# Patient Record
Sex: MALE | Race: AMERICAN INDIAN OR ALASKA NATIVE
[De-identification: names, ages, dates, MRNs, and addresses within clinical notes are randomized per-mention and may not be internally consistent; named-entity substitution may affect disease eponyms.]

---

## 2018-04-07 ENCOUNTER — HOSPITAL ENCOUNTER (EMERGENCY)
Dept: HOSPITAL 50 - VM.ED | Age: 38
LOS: 1 days | Discharge: HOME | End: 2018-04-08
Payer: MEDICAID

## 2018-04-07 DIAGNOSIS — T78.3XXA: ICD-10-CM

## 2018-04-07 DIAGNOSIS — F17.210: ICD-10-CM

## 2018-04-07 DIAGNOSIS — T43.4X5A: ICD-10-CM

## 2018-04-07 DIAGNOSIS — Z79.899: ICD-10-CM

## 2018-04-07 DIAGNOSIS — G24.02: Primary | ICD-10-CM

## 2018-04-07 DIAGNOSIS — Z88.8: ICD-10-CM

## 2018-04-07 LAB
CHLORIDE SERPL-SCNC: 108 MMOL/L (ref 98–107)
SODIUM SERPL-SCNC: 143 MMOL/L (ref 136–145)

## 2018-04-07 PROCEDURE — 85025 COMPLETE CBC W/AUTO DIFF WBC: CPT

## 2018-04-07 PROCEDURE — 84484 ASSAY OF TROPONIN QUANT: CPT

## 2018-04-07 PROCEDURE — 99284 EMERGENCY DEPT VISIT MOD MDM: CPT

## 2018-04-07 PROCEDURE — 85610 PROTHROMBIN TIME: CPT

## 2018-04-07 PROCEDURE — 96374 THER/PROPH/DIAG INJ IV PUSH: CPT

## 2018-04-07 PROCEDURE — 80053 COMPREHEN METABOLIC PANEL: CPT

## 2018-04-07 PROCEDURE — 36415 COLL VENOUS BLD VENIPUNCTURE: CPT

## 2018-04-07 PROCEDURE — 96375 TX/PRO/DX INJ NEW DRUG ADDON: CPT

## 2018-04-07 PROCEDURE — 93005 ELECTROCARDIOGRAM TRACING: CPT

## 2018-04-08 NOTE — EDM.PDOC
ED HPI GENERAL MEDICAL PROBLEM





- General


Chief Complaint: Allergic Reaction


Stated Complaint: allergic reaction


Time Seen by Provider: 04/07/18 22:55


Source of Information: Reports: Patient


History Limitations: Reports: No Limitations





- History of Present Illness


INITIAL COMMENTS - FREE TEXT/NARRATIVE: 





Pt. states that he took 10 mg of haldol and shortly thereafter developed onset 

of L sided tongue and lip swelling, as well as muscle spasms and involuntary 

movements to his jaw. Pt. has a history of similar symptoms when he has taken 

compazine. He denies any throat tightness, wheezing, or shortness of breath. 


Pt. states that the symptoms started approx. 20 min after taking the 

medication. He denies any substernal chest pain and starts that he does have 

muscle spasms in his L anterior chest that extend up into his anterior neck and 

jaw.


He has never been formally diagnosed with tardive dyskinesia or dystonic 

reaction, but describes symptoms of this in the past, particularly associated 

with the consumption of first generation antipsychotic medications and other 

phenothiazines.


Location: Reports: Head, Neck, Chest


Quality: Reports: Ache, Throbbing


Severity: Moderate


  ** Chest


Pain Score (Numeric/FACES): 10





- Related Data


 Allergies











Allergy/AdvReac Type Severity Reaction Status Date / Time


 


prochlorperazine Allergy  Seizure Verified 04/07/18 23:03





[From Compazine]     











Home Meds: 


 Home Meds





Benztropine [Cogentin] 1 mg PO QID PRN 04/07/18 [History]


Divalproex Sodium 2 tab PO BEDTIME 04/07/18 [History]


Escitalopram [Lexapro] 10 mg PO DAILY 04/07/18 [History]


Gabapentin [Neurontin] 1 tab PO TID 04/07/18 [History]


Haloperidol [Haldol] 1 tab PO QID PRN 04/07/18 [History]











Past Medical History


Cardiovascular History: Reports: Other (See Below)


Other Cardiovascular History: bradycardia.  Is supposed to make appt with 

cardiology next week to discuss a pacemaker.


Psychiatric History: Reports: Addiction, Schizophrenia





Social & Family History





- Tobacco Use


Smoking Status *Q: Current Every Day Smoker


Years of Tobacco use: 33


Packs/Tins Daily: 0.7





- Recreational Drug Use


Recreational Drug Use: Yes


Drug Use in Last 12 Months: Yes


Recreational Drug Type: Reports: Barbituates, Cocaine, Heroin, Methamphetamine





ED ROS GENERAL





- Review of Systems


Review Of Systems: See Below


Constitutional: Reports: No Symptoms


HEENT: Reports: Other (See HPI. Complains of facial tics and jaw/neck pain as 

well as facial swelling)


Respiratory: Reports: No Symptoms


Cardiovascular: Reports: No Symptoms


Endocrine: Reports: No Symptoms


GI/Abdominal: Reports: No Symptoms


: Reports: No Symptoms


Musculoskeletal: Reports: No Symptoms


Skin: Reports: No Symptoms


Neurological: Reports: Tremors, Other (please see HPI).  Denies: Confusion, 

Dizziness, Headache, Paresthesia, Seizure, Syncope, Trouble Speaking, 

Difficulty Walking, Weakness, Change in Speech, Gait Disturbance


Psychiatric: Reports: Other (History of depression and anxiety, previously was 

IV meth, cocaine, and heroin user, recently completed treatment).  Denies: 

Agitation, Anxiety, Confusion, Cravings, Depression, Hallucinations, Homicidal 

Ideation, Mood Lability, Suicidal Ideation


Hematologic/Lymphatic: Reports: No Symptoms


Immunologic: Reports: No Symptoms





ED EXAM, GENERAL





- Physical Exam


Exam: See Below


Exam Limited By: No Limitations


General Appearance: Alert, WD/WN, No Apparent Distress


Eye Exam: Bilateral Eye: EOMI, Normal Fundi, Normal Inspection, PERRL


Ears: Normal External Exam, Normal Canal, Hearing Grossly Normal, Normal TMs


Ear Exam: Bilateral Ear: Auricle Normal, Canal Normal, TM normal


Nose: Normal Inspection, Normal Mucosa, No Blood


Throat/Mouth: Normal Gums, Normal Voice, No Airway Compromise, Other


Head: Atraumatic, Facial Swelling (R sided lip and tongue edema, mild)


Neck: Supple, Tender Lateral (L lateral neck discomfort/muscle spasm).  No: 

Limited Range of Motion, Lymphadenopathy (L), Lymphadenopathy (R)


Respiratory/Chest: No Respiratory Distress, Lungs Clear, Normal Breath Sounds, 

No Accessory Muscle Use, Chest Non-Tender


Cardiovascular: Normal Peripheral Pulses, Regular Rate, Rhythm, No Edema, No 

Gallop, No JVD, No Murmur, No Rub


GI/Abdominal: Normal Bowel Sounds, Soft, Non-Tender, No Organomegaly, No 

Distention, No Abnormal Bruit, No Mass


 (Male) Exam: Deferred


Rectal (Males) Exam: Deferred


Back Exam: Normal Inspection, Full Range of Motion, NT


Extremities: Normal Inspection, Normal Range of Motion, Non-Tender, Normal 

Capillary Refill, No Pedal Edema


Neurological: Alert, Oriented, CN II-XII Intact, Normal Cognition, Normal Gait, 

Normal Reflexes, No Motor/Sensory Deficits


Psychiatric: Normal Affect, Normal Mood


Skin Exam: Warm, Dry, Intact, Normal Color, No Rash





Course





- Vital Signs


Last Recorded V/S: 





 Last Vital Signs











Temp  36.9 C   04/07/18 22:55


 


Pulse  59 L  04/07/18 22:55


 


Resp  20   04/07/18 22:55


 


BP  147/77 H  04/07/18 22:55


 


Pulse Ox  97   04/07/18 22:55














- Orders/Labs/Meds


Orders: 





 Active Orders 24 hr











 Category Date Time Status


 


 EKG Documentation Completion [RC] STAT Care  04/07/18 23:01 Active


 


 Peripheral IV Insertion Adult [OM.PC] Routine Oth  04/07/18 23:01 Ordered











Labs: 





 Laboratory Tests











  04/07/18 04/07/18 04/07/18 Range/Units





  23:15 23:15 23:15 


 


WBC   11.0 H   (4.0-10.0)  x10^3/uL


 


RBC   3.90 L   (4.5-6.0)  x10^6/uL


 


Hgb   13.4 L   (14.0-18.0)  g/dL


 


Hct   39.2 L   (40.0-52.0)  %


 


MCV   100.5 H   (78.0-93.0)  fL


 


MCH   34.4 H   (26.0-32.0)  pg


 


MCHC   34.2   (32.0-36.0)  g/dL


 


RDW Coeff of Saida   12.5   (10.0-15.0)  %


 


Plt Count   235   (130-400)  x10^3/uL


 


Neut % (Auto)   67.0   (50.0-80.0)  %


 


Lymph % (Auto)   24.0 L   (25.0-50.0)  %


 


Mono % (Auto)   7.4   (2.0-11.0)  %


 


Eos % (Auto)   1.1   (0.0-4.0)  %


 


Baso % (Auto)   0.5   (0.2-1.2)  %


 


PT  10.6    (9.8-11.8)  SEC


 


INR  1.0 L    (2.0-3.5)  


 


Sodium    143  (136-145)  mmol/L


 


Potassium    3.9  (3.5-5.1)  mmol/L


 


Chloride    108 H  ()  mmol/L


 


Carbon Dioxide    25  (21-32)  mmol/L


 


Anion Gap    13.9  


 


BUN    14  (7-18)  mg/dL


 


Creatinine    1.1  (0.70-1.30)  mg/dL


 


Est Cr Clr Drug Dosing    120.02  mL/min


 


Estimated GFR (MDRD)    > 60  


 


Glucose    94  ()  mg/dL


 


Calcium    9.0  (8.5-10.1)  mg/dL


 


Corrected Calcium    8.92  (8.5-10.1)  mg/dL


 


Total Bilirubin    0.4  (0.2-1.0)  mg/dL


 


AST    10 L  (15-37)  U/L


 


ALT    15 L  (16-63)  U/L


 


Alkaline Phosphatase    86  ()  U/L


 


Troponin I    < 0.017  (<=0.056)  ng/mL


 


Total Protein    7.9  (6.4-8.2)  g/dL


 


Albumin    4.1  (3.4-5.0)  g/dL


 


Globulin    3.8  


 


Albumin/Globulin Ratio    1.08  











Meds: 





Medications














Discontinued Medications














Generic Name Dose Route Start Last Admin





  Trade Name Freq  PRN Reason Stop Dose Admin


 


Diphenhydramine HCl  50 mg  04/07/18 23:01  04/07/18 23:06





  Benadryl  IVPUSH  04/07/18 23:02  50 mg





  ONETIME ONE   Administration





     





     





     





     


 


Ketorolac Tromethamine  30 mg  04/07/18 23:58  04/08/18 00:04





  Toradol  IVPUSH  04/07/18 23:59  30 mg





  ONETIME ONE   Administration





     





     





     





     


 


Ketorolac Tromethamine  1 packet  04/08/18 00:00  04/08/18 00:06





  Take Home: Ketorolac 10 Mg, 4 Tab Pack  PO  04/08/18 00:01  1 packet





  ONETIME ONE   Administration





     





     





     





     


 


Methylprednisolone Sodium Succinate  125 mg  04/07/18 23:01  04/07/18 23:06





  Solu-Medrol  IV  04/07/18 23:02  125 mg





  ONETIME ONE   Administration





     





     





     





     


 


Prednisone  1 packet  04/08/18 00:00  04/08/18 00:06





  Take Home: Prednisone 20 Mg, 2 Tab Pack  PO  04/08/18 00:01  1 packet





  ONETIME ONE   Administration





     





     





     





     


 


Sodium Chloride  10 ml  04/07/18 23:00  





  Saline Flush  FLUSH   





  ASDIRECTED PRN   





  Keep Vein Open   





     





     





     














Departure





- Departure


Time of Disposition: 00:24


Disposition: Home, Self-Care 01


Condition: Good


Clinical Impression: 


 Acute dystonic reaction due to drugs, Angioedema








- Discharge Information


Instructions:  Angioedema, Easy-to-Read, Dystonic Reaction


Referrals: 


PCP,None [Primary Care Provider] - 


Forms:  ED Department Discharge


Additional Instructions: 


Toradol 10mg every 6 hours as needed for pain





Prednisone 40mg daily





Benadryl 50mg every 4-6 hours





Follow-up in clinic in 7-10 days





Avoid phenothiazine medications. Haldol is very closely related to compazine (

prochlorperazine), phenergan (promethazine), and thorazine (chlorpromazine). I 

think you should avoid this class of medications altogether. 





- My Orders


Last 24 Hours: 





My Active Orders





04/07/18 23:01


EKG Documentation Completion [RC] STAT 


Peripheral IV Insertion Adult [OM.PC] Routine 














- Assessment/Plan


Last 24 Hours: 





My Active Orders





04/07/18 23:01


EKG Documentation Completion [RC] STAT 


Peripheral IV Insertion Adult [OM.PC] Routine

## 2018-05-08 ENCOUNTER — HOSPITAL ENCOUNTER (EMERGENCY)
Dept: HOSPITAL 50 - VM.ED | Age: 38
Discharge: OTHER INSTITUTION | End: 2018-05-08
Payer: MEDICAID

## 2018-05-08 DIAGNOSIS — F10.129: Primary | ICD-10-CM

## 2018-05-08 DIAGNOSIS — Z59.0: ICD-10-CM

## 2018-05-08 DIAGNOSIS — Z79.899: ICD-10-CM

## 2018-05-08 DIAGNOSIS — Z88.8: ICD-10-CM

## 2018-05-08 SDOH — ECONOMIC STABILITY - HOUSING INSECURITY: HOMELESSNESS: Z59.0

## 2020-04-27 ENCOUNTER — HOSPITAL ENCOUNTER (EMERGENCY)
Dept: HOSPITAL 50 - VM.ED | Age: 40
Discharge: PSYCHIATRIC OR SUBSTANCE USE FACILITY | End: 2020-04-27
Payer: MEDICAID

## 2020-04-27 DIAGNOSIS — R44.3: Primary | ICD-10-CM

## 2020-04-27 DIAGNOSIS — F15.10: ICD-10-CM

## 2020-04-27 DIAGNOSIS — Z88.5: ICD-10-CM

## 2020-04-27 DIAGNOSIS — F12.10: ICD-10-CM

## 2020-04-27 DIAGNOSIS — F19.10: ICD-10-CM

## 2020-04-27 DIAGNOSIS — Z88.8: ICD-10-CM

## 2020-04-27 DIAGNOSIS — F17.210: ICD-10-CM

## 2020-04-27 LAB
ANION GAP SERPL CALC-SCNC: 20 MMOL/L (ref 10–20)
APAP SERPL-SCNC: 0 UG/ML (ref 10–30)
BARBITURATES UR QL SCN: NEGATIVE
BENZODIAZ UR QL SCN: NEGATIVE
CHLORIDE SERPL-SCNC: 106 MMOL/L (ref 98–107)
EDDP,URINE SCREEN: NEGATIVE
METHADONE UR QL SCN: POSITIVE
SODIUM SERPL-SCNC: 144 MMOL/L (ref 136–145)
TCA SCREEN,URINE: NEGATIVE
THC UR QL SCN>50 NG/ML: POSITIVE

## 2020-04-27 NOTE — EDM.PDOCBH
ED HPI GENERAL MEDICAL PROBLEM





- General


Chief Complaint: Behavioral/Psych


Stated Complaint: BEHAVIORAL


Time Seen by Provider: 04/27/20 09:30


Source of Information: Reports: Patient


History Limitations: Reports: No Limitations





- History of Present Illness


INITIAL COMMENTS - FREE TEXT/NARRATIVE: 





Pt presents wanting admit to psych for hallucinations and psych difficulties  

Pt recently admitted to psych in [place]  Pt did not  his 

medications and hitch-hiked here  Hearing voices  Has had issues with self harm 

in past  Pt has been drinking and using meth and marijuana


Onset: Gradual


Duration: Day(s):, Chronic


Context: Reports: Other (drug use)





- Related Data


 Allergies











Allergy/AdvReac Type Severity Reaction Status Date / Time


 


divalproex sodium Allergy  Seizure Verified 04/27/20 09:35





[From Depakote]     


 


haloperidol Allergy  Seizure Verified 04/27/20 09:35


 


hydrocodone Allergy  Swelling Verified 04/27/20 09:35


 


prochlorperazine Allergy  Seizure Verified 04/27/20 09:35





[From Compazine]     











Home Meds: 


 Home Meds





. [No Known Home Meds]  04/27/20 [History]











Past Medical History


Cardiovascular History: Reports: Other (See Below)


Other Cardiovascular History: bradycardia.


Psychiatric History: Reports: Addiction, Bipolar, Schizophrenia, Suicidal 

Ideation, Other (See Below)


Other Psychiatric History: cutting





Social & Family History





- Tobacco Use


Smoking Status *Q: Current Every Day Smoker


Years of Tobacco use: 20


Packs/Tins Daily: 1





- Recreational Drug Use


Recreational Drug Use: Yes


Drug Use in Last 12 Months: Yes


Recreational Drug Type: Reports: Amphetamines (Speed), Marijuana/Hashish


Recreational Drug Use Frequency: Daily





ED ROS GENERAL





- Review of Systems


Review Of Systems: See Below


Constitutional: Reports: No Symptoms


HEENT: Reports: No Symptoms


Respiratory: Reports: No Symptoms


Cardiovascular: Reports: No Symptoms


GI/Abdominal: Reports: No Symptoms


Musculoskeletal: Reports: No Symptoms


Neurological: Reports: No Symptoms


Psychiatric: Reports: Agitation, Anxiety, Depression, Mood Lability (drug use)





ED EXAM, BEHAVIORAL HEALTH





- Physical Exam


Exam: See Below


Exam Limited By: No Limitations


General Appearance: Mild Distress


Respiratory/Chest: Lungs Clear


Cardiovascular: Regular Rate, Rhythm


GI/Abdominal: Soft


Extremities: Normal Inspection


Neurological: Alert, Normal Cognition, No Motor/Sensory Deficits, Oriented x 3


Psychiatric: Depressed Mood, Flat Affect, Withdrawn, Auditory Hallucinations





COURSE, BEHAVIORAL HEALTH COMP





- Course


Vital Signs: 





 Last Vital Signs











Temp  97.9 F   04/27/20 09:30


 


Pulse  97   04/27/20 09:30


 


Resp  18   04/27/20 09:30


 


BP  156/102 H  04/27/20 09:30


 


Pulse Ox  95   04/27/20 09:30











Orders, Labs, Meds: 





 Active Orders 24 hr











 Category Date Time Status


 


 SALICYLATE [REF] Stat Lab  04/27/20 10:08 Received








 Laboratory Tests











  04/27/20 04/27/20 04/27/20 Range/Units





  10:08 10:08 11:19 


 


WBC  13.2 H    (4.0-10.0)  x10^3/uL


 


RBC  4.29 L    (4.5-6.0)  x10^6/uL


 


Hgb  13.7 L    (14.0-18.0)  g/dL


 


Hct  39.6 L    (40.0-52.0)  %


 


MCV  92.3  D    (78.0-93.0)  fL


 


MCH  31.9    (26.0-32.0)  pg


 


MCHC  34.6    (32.0-36.0)  g/dL


 


RDW Coeff of Saida  14.4    (10.0-15.0)  %


 


Plt Count  251    (130-400)  x10^3/uL


 


Neut % (Auto)  62.8    (50.0-80.0)  %


 


Lymph % (Auto)  23.9 L    (25.0-50.0)  %


 


Mono % (Auto)  11.9 H    (2.0-11.0)  %


 


Eos % (Auto)  0.3    (0.0-4.0)  %


 


Baso % (Auto)  1.1    (0.2-1.2)  %


 


Sodium   144   (136-145)  mmol/L


 


Potassium   4.0   (3.5-5.1)  mmol/L


 


Chloride   106   ()  mmol/L


 


Carbon Dioxide   22   (21-32)  mmol/L


 


Anion Gap   20.0   (10-20)  mmol/L


 


BUN   15   (7-18)  mg/dL


 


Creatinine   1.2   (0.70-1.30)  mg/dL


 


Est Cr Clr Drug Dosing   TNP   


 


Estimated GFR (MDRD)   > 60   


 


Glucose   106   ()  mg/dL


 


Calcium   9.2   (8.5-10.1)  mg/dL


 


Corrected Calcium   8.96   (8.5-10.1)  mg/dL


 


Total Bilirubin   0.6   (0.2-1.0)  mg/dL


 


AST   29   (15-37)  U/L


 


ALT   15 L   (16-63)  U/L


 


Alkaline Phosphatase   74   ()  U/L


 


Total Protein   8.0   (6.4-8.2)  g/dL


 


Albumin   4.3   (3.4-5.0)  g/dL


 


Globulin   3.7   


 


Albumin/Globulin Ratio   1.16   


 


TSH, Ultra Sensitive   3.549   (0.358-3.74)  uIU/mL


 


Urine Opiates Screen    Negative  (NEAGTIVE)  


 


Ur Buprenorphine Scrn    Negative  (NEGATIVE)  


 


Ur Oxycodone Screen    Negative  (NEGATIVE)  


 


Ur EDDP (Meth Metab)    Negative  (NEGATIVE)  


 


Urine Methadone Screen    Negative  (NEGATIVE)  


 


Acetaminophen   0 L   (10-30)  ug/ml


 


Ur Barbiturates Screen    Negative  (NEGATIVE)  


 


Ur Tricyclics Screen    Negative  (NEGATIVE)  


 


Ur Phencyclidine Scrn    Negative  (NEGATIVE)  


 


Ur Amphetamine Screen    Positive H  (NEGATIVE)  


 


U Methamphetamines Scrn    Positive H  (NEGATIVE)  


 


Urine MDMA Screen    Negative  (NEGATIVE)  


 


U Benzodiazepines Scrn    Negative  (NEGATIVE)  


 


U Cocaine Metab Screen    Negative  (NEGATIVE)  


 


U Marijuana (THC) Screen    Positive H  (NEGATIVE)  


 


Ethyl Alcohol   < 3   (0-3)  mg/dL











Re-Assessment/Re-Exam: 





See lab  D/W Dr Haider Luna [place] on-call psych  Will accept in transfer





Departure





- Departure


Time of Disposition: 12:15


Disposition: DC/Tfer to Psych Hosp/[facility] [address]


Clinical Impression: 


 Hallucinations, Drug abuse








- Discharge Information


*COPY OF PRESCRIPTION DRUG MONITORING REPORT IN PATIENT KETAN: Not Applicable


Referrals: 


PCP,None [Primary Care Provider] - 


Forms:  ED Department Discharge, Interfacility Transfer [facility]





Sepsis Event Note





- Evaluation


Sepsis Screening Result: No Definite Risk





- Focused Exam


Vital Signs: 





 Vital Signs











  Temp Pulse Resp BP Pulse Ox


 


 04/27/20 09:30  97.9 F  97  18  156/102 H  95











Date Exam was Performed: 04/27/20


Time Exam was Performed: 11:59





- My Orders


Last 24 Hours: 





My Active Orders





04/27/20 10:08


SALICYLATE [REF] Stat 














- Assessment/Plan


Last 24 Hours: 





My Active Orders





04/27/20 10:08


SALICYLATE [REF] Stat

## 2020-11-30 ENCOUNTER — HOSPITAL ENCOUNTER (EMERGENCY)
Dept: HOSPITAL 50 - VM.ED | Age: 40
Discharge: PSYCHIATRIC OR SUBSTANCE USE FACILITY | End: 2020-11-30
Payer: MEDICAID

## 2020-11-30 DIAGNOSIS — Z88.8: ICD-10-CM

## 2020-11-30 DIAGNOSIS — F25.9: Primary | ICD-10-CM

## 2020-11-30 DIAGNOSIS — Z88.5: ICD-10-CM

## 2020-11-30 DIAGNOSIS — Z20.828: ICD-10-CM

## 2020-11-30 LAB
ANION GAP SERPL CALC-SCNC: 14.7 MMOL/L (ref 10–20)
APAP SERPL-SCNC: 0 UG/ML (ref 10–30)
BARBITURATES UR QL SCN: NEGATIVE
BENZODIAZ UR QL SCN: POSITIVE
CHLORIDE SERPL-SCNC: 97 MMOL/L (ref 98–107)
EDDP,URINE SCREEN: NEGATIVE
METHADONE UR QL SCN: POSITIVE
SODIUM SERPL-SCNC: 133 MMOL/L (ref 136–145)
TCA SCREEN,URINE: NEGATIVE
THC UR QL SCN>50 NG/ML: NEGATIVE

## 2020-11-30 PROCEDURE — U0002 COVID-19 LAB TEST NON-CDC: HCPCS

## 2020-11-30 NOTE — EDM.PDOC
ED HPI GENERAL MEDICAL PROBLEM





- General


Chief Complaint: Behavioral/Psych


Stated Complaint: psych complaints


Time Seen by Provider: 11/30/20 01:45


Source of Information: Reports: Patient


History Limitations: Reports: No Limitations





- History of Present Illness


INITIAL COMMENTS - FREE TEXT/NARRATIVE: 





Pt. presents to ER via [facility]. Pt. called 911, stating that he has been experien

cing visual and auditory hallucinations. He has a longstanding history of 

schizoaffective disorder, bipolar type with severe polysubstance abuse. Pt. was 

hospitalized at [facility] for suicidal ideation and acute psychosis from 

11- until 11- for similar problems. He was started on olanzapine 

10mg at bedtime and he was sent up with [facility]. He was 

subsequently discharged, as he was not actively participating and poorly 

cooperative. Pt. states that he has not been taking the olanzapine, as it causes

him to feel like his heart rate is decreasing. 


Since he was discharged from [facility], he was given a taxi voucher to 

[place]. From there, his brother picked him up and brought him to [place], [place]. He was also seen in the ER there. He was was not taking his medication and 

had been using meth again. He was given IM ativan and zyprexa and was set up on 

a crisis call through the hospital in [place], but did not participate

in the call and was discharged, according to patient. Pt. states that he was 

driven back to [place]. His brother again picked him up and brought him to 

[place], where he has close friends that he has known for 18 years. It was 

from their residence that the patient called the police.


On arrival to ER, pt. thought process is very concrete. He has an excellent 

understanding of his mental health problems and addiction history. According to 

information gleaned from his last admission, he has been hospitalized over 18 

times since 2015. The last time he was in the [facility] hospital was in 2005. He is 

quite paranoid, writing notes stating that people are able to listen to him 

speak and that his ex and her friends from [place] want to "taze him".  


Pt. states that he always feels somewhat suicidal; when asked if he actively 

felt like he wanted to harm himself, he stated yes, he would either cut himself 

with a knife of overdose on medication. He states that he also sees spirits in 

addition to hearing voices. This is similar to what he reported to psychiatry in

[place].


Pt. states that he last used methamphetamine 24 hours ago, and states he is 

drinking today.


Onset: Today





- Related Data


                                    Allergies











Allergy/AdvReac Type Severity Reaction Status Date / Time


 


divalproex sodium Allergy  Seizure Verified 11/30/20 02:04





[From Depakote]     


 


haloperidol Allergy  Seizure Verified 11/30/20 02:04


 


hydrocodone Allergy  Swelling Verified 11/30/20 02:04


 


prochlorperazine Allergy  Seizure Verified 11/30/20 02:04





[From Compazine]     











Home Meds: 


                                    Home Meds





. [No Known Home Meds]  04/27/20 [History]











Past Medical History


Cardiovascular History: Reports: Other (See Below)


Other Cardiovascular History: bradycardia.


Psychiatric History: Reports: Addiction, Bipolar, Schizophrenia, Suicidal 

Ideation, Other (See Below)


Other Psychiatric History: cutting





Social & Family History





- Tobacco Use


Tobacco Use Status *Q: Current Status Unknown





ED ROS GENERAL





- Review of Systems


Review Of Systems: See Below


Constitutional: Reports: No Symptoms


HEENT: Reports: No Symptoms


Respiratory: Reports: No Symptoms


Cardiovascular: Reports: No Symptoms


Endocrine: Reports: No Symptoms


GI/Abdominal: Reports: No Symptoms


: Reports: No Symptoms


Musculoskeletal: Reports: No Symptoms


Skin: Reports: No Symptoms


Neurological: Reports: No Symptoms


Psychiatric: Reports: Agitation, Anxiety, Hallucinations, Suicidal Ideation


Hematologic/Lymphatic: Reports: No Symptoms


Immunologic: Reports: No Symptoms





ED EXAM, GENERAL





- Physical Exam


Exam: See Below


Exam Limited By: No Limitations


General Appearance: Alert, WD/WN, No Apparent Distress


Head: Atraumatic, Normocephalic


Respiratory/Chest: No Respiratory Distress, Lungs Clear, No Accessory Muscle 

Use, Chest Non-Tender


Cardiovascular: Normal Peripheral Pulses, Regular Rate, Rhythm, No JVD


Extremities: Normal Inspection, Normal Range of Motion, Normal Capillary Refill


Neurological: Alert, Oriented, CN II-XII Intact, Normal Cognition, Normal Gait, 

No Motor/Sensory Deficits


Psychiatric: Anxious, Flat Affect


Skin Exam: Warm, Dry, Intact, Normal Color, No Rash





Course





- Vital Signs


Last Recorded V/S: 


                                Last Vital Signs











Temp  36.6 C   11/30/20 01:45


 


Pulse  84   11/30/20 01:45


 


Resp  18   11/30/20 01:45


 


BP  128/83   11/30/20 01:45


 


Pulse Ox  95   11/30/20 01:45














- Orders/Labs/Meds


Labs: 


                                Laboratory Tests











  11/30/20 11/30/20 11/30/20 Range/Units





  02:15 02:35 02:35 


 


WBC   10.7 H   (4.0-10.0)  x10^3/uL


 


RBC   4.18 L   (4.5-6.0)  x10^6/uL


 


Hgb   13.4 L   (14.0-18.0)  g/dL


 


Hct   38.2 L   (40.0-52.0)  %


 


MCV   91.4   (78.0-93.0)  fL


 


MCH   32.1 H   (26.0-32.0)  pg


 


MCHC   35.1   (32.0-36.0)  g/dL


 


RDW Coeff of Saida   12.1   (10.0-15.0)  %


 


Plt Count   250   (130-400)  x10^3/uL


 


Neut % (Auto)   64.1   (50.0-80.0)  %


 


Lymph % (Auto)   25.8   (25.0-50.0)  %


 


Mono % (Auto)   8.3   (2.0-11.0)  %


 


Eos % (Auto)   1.1   (0.0-4.0)  %


 


Baso % (Auto)   0.7   (0.2-1.2)  %


 


PT    10.2  (9.5-12.3)  SEC


 


INR    0.9 L  (2.0-3.5)  


 


Sodium     (136-145)  mmol/L


 


Potassium     (3.5-5.1)  mmol/L


 


Chloride     ()  mmol/L


 


Carbon Dioxide     (21-32)  mmol/L


 


Anion Gap     (10-20)  mmol/L


 


BUN     (7-18)  mg/dL


 


Creatinine     (0.70-1.30)  mg/dL


 


Est Cr Clr Drug Dosing     


 


Estimated GFR (MDRD)     


 


Glucose     ()  mg/dL


 


Calcium     (8.5-10.1)  mg/dL


 


Corrected Calcium     (8.5-10.1)  mg/dL


 


Magnesium     (1.8-2.4)  mg/dL


 


Total Bilirubin     (0.2-1.0)  mg/dL


 


AST     (15-37)  U/L


 


ALT     (16-63)  U/L


 


Alkaline Phosphatase     ()  U/L


 


Total Protein     (6.4-8.2)  g/dL


 


Albumin     (3.4-5.0)  g/dL


 


Globulin     


 


Albumin/Globulin Ratio     


 


TSH, Ultra Sensitive     (0.358-3.74)  uIU/mL


 


Urine Color     (YELLOW)  


 


Urine Appearance     (CLEAR)  


 


Urine pH     (5.0-8.0)  


 


Ur Specific Gravity     


 


Urine Protein     (NEGATIVE)  mg/dL


 


Urine Glucose (UA)     (NEGATIVE)  mg/dL


 


Urine Ketones     (NEGATIVE)  mg/dL


 


Urine Occult Blood     (NEGATIVE)  


 


Urine Nitrite     (NEGATIVE)  


 


Urine Bilirubin     (NEGATIVE)  


 


Urine Urobilinogen     (0.2)  EU/dL


 


Ur Leukocyte Esterase     (NEGATIVE)  


 


Urine Opiates Screen     (NEAGTIVE)  


 


Ur Buprenorphine Scrn     (NEGATIVE)  


 


Ur Oxycodone Screen     (NEGATIVE)  


 


Ur EDDP (Meth Metab)     (NEGATIVE)  


 


Urine Methadone Screen     (NEGATIVE)  


 


Acetaminophen     (10-30)  ug/ml


 


Ur Barbiturates Screen     (NEGATIVE)  


 


Ur Tricyclics Screen     (NEGATIVE)  


 


Ur Phencyclidine Scrn     (NEGATIVE)  


 


Ur Amphetamine Screen     (NEGATIVE)  


 


U Methamphetamines Scrn     (NEGATIVE)  


 


Urine MDMA Screen     (NEGATIVE)  


 


U Benzodiazepines Scrn     (NEGATIVE)  


 


U Cocaine Metab Screen     (NEGATIVE)  


 


U Marijuana (THC) Screen     (NEGATIVE)  


 


Ethyl Alcohol     (0-3)  mg/dL


 


SARS CoV-2 RNA Rapid JEM  Negative    (NEGATIVE)  














  11/30/20 11/30/20 11/30/20 Range/Units





  02:35 02:48 02:48 


 


WBC     (4.0-10.0)  x10^3/uL


 


RBC     (4.5-6.0)  x10^6/uL


 


Hgb     (14.0-18.0)  g/dL


 


Hct     (40.0-52.0)  %


 


MCV     (78.0-93.0)  fL


 


MCH     (26.0-32.0)  pg


 


MCHC     (32.0-36.0)  g/dL


 


RDW Coeff of Saida     (10.0-15.0)  %


 


Plt Count     (130-400)  x10^3/uL


 


Neut % (Auto)     (50.0-80.0)  %


 


Lymph % (Auto)     (25.0-50.0)  %


 


Mono % (Auto)     (2.0-11.0)  %


 


Eos % (Auto)     (0.0-4.0)  %


 


Baso % (Auto)     (0.2-1.2)  %


 


PT     (9.5-12.3)  SEC


 


INR     (2.0-3.5)  


 


Sodium  133 L    (136-145)  mmol/L


 


Potassium  3.7    (3.5-5.1)  mmol/L


 


Chloride  97 L    ()  mmol/L


 


Carbon Dioxide  25    (21-32)  mmol/L


 


Anion Gap  14.7    (10-20)  mmol/L


 


BUN  10    (7-18)  mg/dL


 


Creatinine  1.1    (0.70-1.30)  mg/dL


 


Est Cr Clr Drug Dosing  TNP    


 


Estimated GFR (MDRD)  > 60    


 


Glucose  100    ()  mg/dL


 


Calcium  8.4 L    (8.5-10.1)  mg/dL


 


Corrected Calcium  8.32 L    (8.5-10.1)  mg/dL


 


Magnesium  1.9    (1.8-2.4)  mg/dL


 


Total Bilirubin  0.4    (0.2-1.0)  mg/dL


 


AST  29    (15-37)  U/L


 


ALT  43    (16-63)  U/L


 


Alkaline Phosphatase  95    ()  U/L


 


Total Protein  7.5    (6.4-8.2)  g/dL


 


Albumin  4.1    (3.4-5.0)  g/dL


 


Globulin  3.4    


 


Albumin/Globulin Ratio  1.21    


 


TSH, Ultra Sensitive  1.633    (0.358-3.74)  uIU/mL


 


Urine Color   Light yellow   (YELLOW)  


 


Urine Appearance   Clear   (CLEAR)  


 


Urine pH   6.5   (5.0-8.0)  


 


Ur Specific Gravity   1.010   


 


Urine Protein   Negative   (NEGATIVE)  mg/dL


 


Urine Glucose (UA)   Negative   (NEGATIVE)  mg/dL


 


Urine Ketones   Negative   (NEGATIVE)  mg/dL


 


Urine Occult Blood   Negative   (NEGATIVE)  


 


Urine Nitrite   Negative   (NEGATIVE)  


 


Urine Bilirubin   Negative   (NEGATIVE)  


 


Urine Urobilinogen   0.2   (0.2)  EU/dL


 


Ur Leukocyte Esterase   Negative   (NEGATIVE)  


 


Urine Opiates Screen    Negative  (NEAGTIVE)  


 


Ur Buprenorphine Scrn    Negative  (NEGATIVE)  


 


Ur Oxycodone Screen    Negative  (NEGATIVE)  


 


Ur EDDP (Meth Metab)    Negative  (NEGATIVE)  


 


Urine Methadone Screen    Negative  (NEGATIVE)  


 


Acetaminophen  0 L    (10-30)  ug/ml


 


Ur Barbiturates Screen    Negative  (NEGATIVE)  


 


Ur Tricyclics Screen    Negative  (NEGATIVE)  


 


Ur Phencyclidine Scrn    Negative  (NEGATIVE)  


 


Ur Amphetamine Screen    Positive H  (NEGATIVE)  


 


U Methamphetamines Scrn    Positive H  (NEGATIVE)  


 


Urine MDMA Screen    Negative  (NEGATIVE)  


 


U Benzodiazepines Scrn    Positive H  (NEGATIVE)  


 


U Cocaine Metab Screen    Negative  (NEGATIVE)  


 


U Marijuana (THC) Screen    Negative  (NEGATIVE)  


 


Ethyl Alcohol  74 H    (0-3)  mg/dL


 


SARS CoV-2 RNA Rapid JEM     (NEGATIVE)  











Meds: 


Medications














Discontinued Medications














Generic Name Dose Route Start Last Admin





  Trade Name Freq  PRN Reason Stop Dose Admin


 


Olanzapine  10 mg  11/30/20 02:41  11/30/20 02:50





  Zyprexa  IM  11/30/20 02:42  10 mg





  ONETIME ONE   Administration














Departure





- Departure


Time of Disposition: 03:50


Disposition: DC/Tfer to Psych Hosp/Unit [facility]


Clinical Impression: 


 Schizoaffective disorder, Psychosis








- Discharge Information


Forms:  ED Department Discharge





Sepsis Event Note (ED)





- Evaluation


Sepsis Screening Result: No Definite Risk





- Focused Exam


Vital Signs: 


                                   Vital Signs











  Temp Pulse Resp BP Pulse Ox


 


 11/30/20 01:45  36.6 C  84  18  128/83  95














- Assessment/Plan


Plan: 





Pt. will be transported to [facility]. Spoke with rachana Garcia at the 

[facility] who accepts patient in transfer. Continue medications he was discharged 

with from [place] (see attached order). Start zyprexa this evening, as he was 

given 10mg IM here in ER. Pt. will be transported via [facility]. Transport order 

signed.